# Patient Record
(demographics unavailable — no encounter records)

---

## 2024-10-14 NOTE — HISTORY OF PRESENT ILLNESS
[de-identified] : Update 10/14/2024: 41M presenting for follow up for Nasal congestion and snoring. Previously prescribed azelastine and flonase. Using sprays intermittently when needed and providing good relief. Only using 1 spray each nostril of Astelin. Sleep study completed with AHI 18.3, moderate KELLEY. Pt denies new symptoms/concerns. Had titration.    41M presenting for follow up for Nasal congestion and snoring. Previously prescribed azelastine and flonase. Using sprays intermittently when needed and providing good relief. Sleep study completed with AHI 18.3, moderate KELLEY. Does not see pulmonologist.  He states he is not regularly taking nasal sprays.

## 2024-10-14 NOTE — PHYSICAL EXAM
[] : septum deviated to the left [de-identified] : hyperplastic turbs and minimal nasal mucosal congestion [Normal] : mucosa is normal [Midline] : trachea located in midline position [de-identified] : macroglossia present

## 2024-10-14 NOTE — ASSESSMENT
[FreeTextEntry1] : Pulm consult for PAP therapy pending.  Continue nasal regimen, increase astelin to 2 sprays BID. Seek attention for epistaxis, nasal discharge, facial pain/pressure, fever, chills, headache.  Patient to followup 1 month after PAP therapy started.

## 2024-10-14 NOTE — REASON FOR VISIT
[Subsequent Evaluation] : a subsequent evaluation for [FreeTextEntry2] : nasal congestion and snoring